# Patient Record
Sex: FEMALE | URBAN - METROPOLITAN AREA
[De-identification: names, ages, dates, MRNs, and addresses within clinical notes are randomized per-mention and may not be internally consistent; named-entity substitution may affect disease eponyms.]

---

## 2021-05-25 ENCOUNTER — RECORDS - HEALTHEAST (OUTPATIENT)
Dept: ADMINISTRATIVE | Facility: CLINIC | Age: 66
End: 2021-05-25

## 2024-07-16 RX ORDER — LISINOPRIL 10 MG/1
10 TABLET ORAL DAILY
COMMUNITY

## 2024-07-16 RX ORDER — ALENDRONATE SODIUM 70 MG/1
70 TABLET ORAL
COMMUNITY

## 2024-07-16 RX ORDER — CALCIUM CARBONATE 500(1250)
1 TABLET ORAL 2 TIMES DAILY WITH MEALS
COMMUNITY

## 2024-07-17 ENCOUNTER — HOSPITAL ENCOUNTER (OUTPATIENT)
Facility: HOSPITAL | Age: 69
Discharge: HOME | End: 2024-07-17
Attending: OBSTETRICS & GYNECOLOGY | Admitting: OBSTETRICS & GYNECOLOGY
Payer: COMMERCIAL

## 2024-07-17 ENCOUNTER — ANESTHESIA EVENT (OUTPATIENT)
Dept: SURGERY | Facility: HOSPITAL | Age: 69
End: 2024-07-17
Payer: COMMERCIAL

## 2024-07-17 ENCOUNTER — ANESTHESIA (OUTPATIENT)
Dept: SURGERY | Facility: HOSPITAL | Age: 69
End: 2024-07-17
Payer: COMMERCIAL

## 2024-07-17 VITALS
TEMPERATURE: 97.9 F | DIASTOLIC BLOOD PRESSURE: 55 MMHG | OXYGEN SATURATION: 99 % | SYSTOLIC BLOOD PRESSURE: 110 MMHG | WEIGHT: 171.5 LBS | RESPIRATION RATE: 16 BRPM | HEART RATE: 64 BPM

## 2024-07-17 DIAGNOSIS — N81.4 UTEROVAGINAL PROLAPSE: Primary | ICD-10-CM

## 2024-07-17 LAB
ABO/RH(D): NORMAL
ANTIBODY SCREEN: NEGATIVE
BASOPHILS # BLD AUTO: 0.1 10E3/UL (ref 0–0.2)
BASOPHILS NFR BLD AUTO: 1 %
EOSINOPHIL # BLD AUTO: 0.2 10E3/UL (ref 0–0.7)
EOSINOPHIL NFR BLD AUTO: 3 %
ERYTHROCYTE [DISTWIDTH] IN BLOOD BY AUTOMATED COUNT: 13.9 % (ref 10–15)
HCT VFR BLD AUTO: 43.3 % (ref 35–47)
HGB BLD-MCNC: 14.5 G/DL (ref 11.7–15.7)
IMM GRANULOCYTES # BLD: 0.1 10E3/UL
IMM GRANULOCYTES NFR BLD: 1 %
LYMPHOCYTES # BLD AUTO: 1.5 10E3/UL (ref 0.8–5.3)
LYMPHOCYTES NFR BLD AUTO: 23 %
MCH RBC QN AUTO: 29.9 PG (ref 26.5–33)
MCHC RBC AUTO-ENTMCNC: 33.5 G/DL (ref 31.5–36.5)
MCV RBC AUTO: 89 FL (ref 78–100)
MONOCYTES # BLD AUTO: 0.4 10E3/UL (ref 0–1.3)
MONOCYTES NFR BLD AUTO: 6 %
NEUTROPHILS # BLD AUTO: 4.4 10E3/UL (ref 1.6–8.3)
NEUTROPHILS NFR BLD AUTO: 67 %
NRBC # BLD AUTO: 0 10E3/UL
NRBC BLD AUTO-RTO: 0 /100
PLATELET # BLD AUTO: 316 10E3/UL (ref 150–450)
RBC # BLD AUTO: 4.85 10E6/UL (ref 3.8–5.2)
SPECIMEN EXPIRATION DATE: NORMAL
WBC # BLD AUTO: 6.7 10E3/UL (ref 4–11)

## 2024-07-17 PROCEDURE — 250N000013 HC RX MED GY IP 250 OP 250 PS 637: Performed by: OBSTETRICS & GYNECOLOGY

## 2024-07-17 PROCEDURE — 250N000011 HC RX IP 250 OP 636: Performed by: PHYSICIAN ASSISTANT

## 2024-07-17 PROCEDURE — 370N000017 HC ANESTHESIA TECHNICAL FEE, PER MIN: Performed by: OBSTETRICS & GYNECOLOGY

## 2024-07-17 PROCEDURE — 258N000003 HC RX IP 258 OP 636: Performed by: ANESTHESIOLOGY

## 2024-07-17 PROCEDURE — 86900 BLOOD TYPING SEROLOGIC ABO: CPT | Performed by: PHYSICIAN ASSISTANT

## 2024-07-17 PROCEDURE — 88311 DECALCIFY TISSUE: CPT | Mod: TC | Performed by: OBSTETRICS & GYNECOLOGY

## 2024-07-17 PROCEDURE — 258N000003 HC RX IP 258 OP 636: Performed by: OBSTETRICS & GYNECOLOGY

## 2024-07-17 PROCEDURE — 250N000011 HC RX IP 250 OP 636: Performed by: NURSE ANESTHETIST, CERTIFIED REGISTERED

## 2024-07-17 PROCEDURE — 85025 COMPLETE CBC W/AUTO DIFF WBC: CPT | Performed by: PHYSICIAN ASSISTANT

## 2024-07-17 PROCEDURE — 44180 LAP ENTEROLYSIS: CPT | Performed by: ANESTHESIOLOGY

## 2024-07-17 PROCEDURE — 710N000009 HC RECOVERY PHASE 1, LEVEL 1, PER MIN: Performed by: OBSTETRICS & GYNECOLOGY

## 2024-07-17 PROCEDURE — 999N000141 HC STATISTIC PRE-PROCEDURE NURSING ASSESSMENT: Performed by: OBSTETRICS & GYNECOLOGY

## 2024-07-17 PROCEDURE — 250N000011 HC RX IP 250 OP 636: Performed by: ANESTHESIOLOGY

## 2024-07-17 PROCEDURE — 250N000009 HC RX 250: Performed by: NURSE ANESTHETIST, CERTIFIED REGISTERED

## 2024-07-17 PROCEDURE — 250N000013 HC RX MED GY IP 250 OP 250 PS 637: Performed by: ANESTHESIOLOGY

## 2024-07-17 PROCEDURE — 36415 COLL VENOUS BLD VENIPUNCTURE: CPT | Performed by: PHYSICIAN ASSISTANT

## 2024-07-17 PROCEDURE — 44180 LAP ENTEROLYSIS: CPT | Performed by: NURSE ANESTHETIST, CERTIFIED REGISTERED

## 2024-07-17 PROCEDURE — 250N000011 HC RX IP 250 OP 636: Performed by: OBSTETRICS & GYNECOLOGY

## 2024-07-17 PROCEDURE — 272N000001 HC OR GENERAL SUPPLY STERILE: Performed by: OBSTETRICS & GYNECOLOGY

## 2024-07-17 PROCEDURE — C1781 MESH (IMPLANTABLE): HCPCS | Performed by: OBSTETRICS & GYNECOLOGY

## 2024-07-17 PROCEDURE — 710N000012 HC RECOVERY PHASE 2, PER MINUTE: Performed by: OBSTETRICS & GYNECOLOGY

## 2024-07-17 PROCEDURE — 360N000080 HC SURGERY LEVEL 7, PER MIN: Performed by: OBSTETRICS & GYNECOLOGY

## 2024-07-17 DEVICE — POLYPROPYLENE MESH FOR SACROCOLPOSUSPENSION/SACROCOLPOPEXY - Y CONTOUR™
Type: IMPLANTABLE DEVICE | Site: PELVIS | Status: FUNCTIONAL
Brand: RESTORELLE

## 2024-07-17 RX ORDER — DEXAMETHASONE SODIUM PHOSPHATE 10 MG/ML
INJECTION, SOLUTION INTRAMUSCULAR; INTRAVENOUS PRN
Status: DISCONTINUED | OUTPATIENT
Start: 2024-07-17 | End: 2024-07-17

## 2024-07-17 RX ORDER — CEFAZOLIN SODIUM/WATER 2 G/20 ML
2 SYRINGE (ML) INTRAVENOUS
Status: COMPLETED | OUTPATIENT
Start: 2024-07-17 | End: 2024-07-17

## 2024-07-17 RX ORDER — FENTANYL CITRATE-0.9 % NACL/PF 10 MCG/ML
PLASTIC BAG, INJECTION (ML) INTRAVENOUS PRN
Status: DISCONTINUED | OUTPATIENT
Start: 2024-07-17 | End: 2024-07-17

## 2024-07-17 RX ORDER — FENTANYL CITRATE 50 UG/ML
INJECTION, SOLUTION INTRAMUSCULAR; INTRAVENOUS PRN
Status: DISCONTINUED | OUTPATIENT
Start: 2024-07-17 | End: 2024-07-17

## 2024-07-17 RX ORDER — IBUPROFEN 200 MG
600 TABLET ORAL ONCE
Status: DISCONTINUED | OUTPATIENT
Start: 2024-07-17 | End: 2024-07-17 | Stop reason: HOSPADM

## 2024-07-17 RX ORDER — ONDANSETRON 4 MG/1
4 TABLET, ORALLY DISINTEGRATING ORAL EVERY 30 MIN PRN
Status: DISCONTINUED | OUTPATIENT
Start: 2024-07-17 | End: 2024-07-17 | Stop reason: HOSPADM

## 2024-07-17 RX ORDER — SODIUM CHLORIDE, SODIUM LACTATE, POTASSIUM CHLORIDE, AND CALCIUM CHLORIDE .6; .31; .03; .02 G/100ML; G/100ML; G/100ML; G/100ML
IRRIGANT IRRIGATION PRN
Status: DISCONTINUED | OUTPATIENT
Start: 2024-07-17 | End: 2024-07-17 | Stop reason: HOSPADM

## 2024-07-17 RX ORDER — NALOXONE HYDROCHLORIDE 0.4 MG/ML
0.1 INJECTION, SOLUTION INTRAMUSCULAR; INTRAVENOUS; SUBCUTANEOUS
Status: DISCONTINUED | OUTPATIENT
Start: 2024-07-17 | End: 2024-07-17 | Stop reason: HOSPADM

## 2024-07-17 RX ORDER — SODIUM CHLORIDE, SODIUM LACTATE, POTASSIUM CHLORIDE, CALCIUM CHLORIDE 600; 310; 30; 20 MG/100ML; MG/100ML; MG/100ML; MG/100ML
INJECTION, SOLUTION INTRAVENOUS CONTINUOUS
Status: DISCONTINUED | OUTPATIENT
Start: 2024-07-17 | End: 2024-07-17 | Stop reason: HOSPADM

## 2024-07-17 RX ORDER — OXYCODONE HYDROCHLORIDE 5 MG/1
5-10 TABLET ORAL EVERY 4 HOURS PRN
Qty: 12 TABLET | Refills: 0 | Status: SHIPPED | OUTPATIENT
Start: 2024-07-17

## 2024-07-17 RX ORDER — KETAMINE HYDROCHLORIDE 10 MG/ML
INJECTION INTRAMUSCULAR; INTRAVENOUS PRN
Status: DISCONTINUED | OUTPATIENT
Start: 2024-07-17 | End: 2024-07-17

## 2024-07-17 RX ORDER — CEFAZOLIN SODIUM/WATER 2 G/20 ML
2 SYRINGE (ML) INTRAVENOUS SEE ADMIN INSTRUCTIONS
Status: DISCONTINUED | OUTPATIENT
Start: 2024-07-17 | End: 2024-07-17 | Stop reason: HOSPADM

## 2024-07-17 RX ORDER — HYDROMORPHONE HCL IN WATER/PF 6 MG/30 ML
0.2 PATIENT CONTROLLED ANALGESIA SYRINGE INTRAVENOUS EVERY 5 MIN PRN
Status: DISCONTINUED | OUTPATIENT
Start: 2024-07-17 | End: 2024-07-17 | Stop reason: HOSPADM

## 2024-07-17 RX ORDER — HYDROMORPHONE HYDROCHLORIDE 1 MG/ML
0.5 INJECTION, SOLUTION INTRAMUSCULAR; INTRAVENOUS; SUBCUTANEOUS EVERY 5 MIN PRN
Status: DISCONTINUED | OUTPATIENT
Start: 2024-07-17 | End: 2024-07-17 | Stop reason: HOSPADM

## 2024-07-17 RX ORDER — ONDANSETRON 2 MG/ML
4 INJECTION INTRAMUSCULAR; INTRAVENOUS EVERY 30 MIN PRN
Status: DISCONTINUED | OUTPATIENT
Start: 2024-07-17 | End: 2024-07-17 | Stop reason: HOSPADM

## 2024-07-17 RX ORDER — OXYCODONE HYDROCHLORIDE 5 MG/1
5 TABLET ORAL
Status: COMPLETED | OUTPATIENT
Start: 2024-07-17 | End: 2024-07-17

## 2024-07-17 RX ORDER — DEXAMETHASONE SODIUM PHOSPHATE 4 MG/ML
4 INJECTION, SOLUTION INTRA-ARTICULAR; INTRALESIONAL; INTRAMUSCULAR; INTRAVENOUS; SOFT TISSUE
Status: DISCONTINUED | OUTPATIENT
Start: 2024-07-17 | End: 2024-07-17 | Stop reason: HOSPADM

## 2024-07-17 RX ORDER — ACETAMINOPHEN 325 MG/1
975 TABLET ORAL ONCE
Status: COMPLETED | OUTPATIENT
Start: 2024-07-17 | End: 2024-07-17

## 2024-07-17 RX ORDER — LIDOCAINE 40 MG/G
CREAM TOPICAL
Status: DISCONTINUED | OUTPATIENT
Start: 2024-07-17 | End: 2024-07-17 | Stop reason: HOSPADM

## 2024-07-17 RX ORDER — LABETALOL HYDROCHLORIDE 5 MG/ML
5 INJECTION, SOLUTION INTRAVENOUS EVERY 10 MIN PRN
Status: DISCONTINUED | OUTPATIENT
Start: 2024-07-17 | End: 2024-07-17 | Stop reason: HOSPADM

## 2024-07-17 RX ORDER — OXYCODONE HYDROCHLORIDE 5 MG/1
10 TABLET ORAL
Status: DISCONTINUED | OUTPATIENT
Start: 2024-07-17 | End: 2024-07-17 | Stop reason: HOSPADM

## 2024-07-17 RX ORDER — PROPOFOL 10 MG/ML
INJECTION, EMULSION INTRAVENOUS PRN
Status: DISCONTINUED | OUTPATIENT
Start: 2024-07-17 | End: 2024-07-17

## 2024-07-17 RX ORDER — DEXAMETHASONE SODIUM PHOSPHATE 10 MG/ML
4 INJECTION, SOLUTION INTRAMUSCULAR; INTRAVENOUS
Status: DISCONTINUED | OUTPATIENT
Start: 2024-07-17 | End: 2024-07-17 | Stop reason: HOSPADM

## 2024-07-17 RX ORDER — ACETAMINOPHEN 325 MG/1
975 TABLET ORAL ONCE
Status: DISCONTINUED | OUTPATIENT
Start: 2024-07-17 | End: 2024-07-17 | Stop reason: HOSPADM

## 2024-07-17 RX ORDER — PROPOFOL 10 MG/ML
INJECTION, EMULSION INTRAVENOUS CONTINUOUS PRN
Status: DISCONTINUED | OUTPATIENT
Start: 2024-07-17 | End: 2024-07-17

## 2024-07-17 RX ORDER — LIDOCAINE HYDROCHLORIDE 10 MG/ML
INJECTION, SOLUTION INFILTRATION; PERINEURAL PRN
Status: DISCONTINUED | OUTPATIENT
Start: 2024-07-17 | End: 2024-07-17

## 2024-07-17 RX ORDER — METRONIDAZOLE 500 MG/100ML
500 INJECTION, SOLUTION INTRAVENOUS ONCE
Status: COMPLETED | OUTPATIENT
Start: 2024-07-17 | End: 2024-07-17

## 2024-07-17 RX ORDER — MAGNESIUM SULFATE 4 G/50ML
4 INJECTION INTRAVENOUS ONCE
Status: COMPLETED | OUTPATIENT
Start: 2024-07-17 | End: 2024-07-17

## 2024-07-17 RX ORDER — ONDANSETRON 2 MG/ML
INJECTION INTRAMUSCULAR; INTRAVENOUS PRN
Status: DISCONTINUED | OUTPATIENT
Start: 2024-07-17 | End: 2024-07-17

## 2024-07-17 RX ORDER — FENTANYL CITRATE 50 UG/ML
25 INJECTION, SOLUTION INTRAMUSCULAR; INTRAVENOUS
Status: DISCONTINUED | OUTPATIENT
Start: 2024-07-17 | End: 2024-07-17 | Stop reason: HOSPADM

## 2024-07-17 RX ORDER — BUPIVACAINE HYDROCHLORIDE 2.5 MG/ML
INJECTION, SOLUTION INFILTRATION; PERINEURAL PRN
Status: DISCONTINUED | OUTPATIENT
Start: 2024-07-17 | End: 2024-07-17 | Stop reason: HOSPADM

## 2024-07-17 RX ADMIN — SODIUM CHLORIDE, POTASSIUM CHLORIDE, SODIUM LACTATE AND CALCIUM CHLORIDE: 600; 310; 30; 20 INJECTION, SOLUTION INTRAVENOUS at 11:15

## 2024-07-17 RX ADMIN — MAGNESIUM SULFATE HEPTAHYDRATE 4 G: 80 INJECTION, SOLUTION INTRAVENOUS at 08:18

## 2024-07-17 RX ADMIN — ROCURONIUM BROMIDE 40 MG: 50 INJECTION, SOLUTION INTRAVENOUS at 10:12

## 2024-07-17 RX ADMIN — SUGAMMADEX 200 MG: 100 INJECTION, SOLUTION INTRAVENOUS at 12:11

## 2024-07-17 RX ADMIN — ONDANSETRON 4 MG: 2 INJECTION INTRAMUSCULAR; INTRAVENOUS at 10:35

## 2024-07-17 RX ADMIN — ACETAMINOPHEN 975 MG: 325 TABLET ORAL at 07:58

## 2024-07-17 RX ADMIN — Medication 100 MCG: at 11:23

## 2024-07-17 RX ADMIN — KETAMINE HYDROCHLORIDE 20 MG: 10 INJECTION INTRAMUSCULAR; INTRAVENOUS at 11:12

## 2024-07-17 RX ADMIN — HYDROMORPHONE HYDROCHLORIDE 0.2 MG: 0.2 INJECTION, SOLUTION INTRAMUSCULAR; INTRAVENOUS; SUBCUTANEOUS at 13:27

## 2024-07-17 RX ADMIN — LIDOCAINE HYDROCHLORIDE 3 ML: 10 INJECTION, SOLUTION INFILTRATION; PERINEURAL at 10:12

## 2024-07-17 RX ADMIN — METRONIDAZOLE 500 MG: 5 INJECTION, SOLUTION INTRAVENOUS at 08:18

## 2024-07-17 RX ADMIN — PROPOFOL 150 MCG/KG/MIN: 10 INJECTION, EMULSION INTRAVENOUS at 10:11

## 2024-07-17 RX ADMIN — HYDROMORPHONE HYDROCHLORIDE 1 MG: 1 INJECTION, SOLUTION INTRAMUSCULAR; INTRAVENOUS; SUBCUTANEOUS at 10:34

## 2024-07-17 RX ADMIN — PROPOFOL 200 MG: 10 INJECTION, EMULSION INTRAVENOUS at 10:11

## 2024-07-17 RX ADMIN — FENTANYL CITRATE 100 MCG: 50 INJECTION INTRAMUSCULAR; INTRAVENOUS at 10:12

## 2024-07-17 RX ADMIN — SODIUM CHLORIDE, POTASSIUM CHLORIDE, SODIUM LACTATE AND CALCIUM CHLORIDE: 600; 310; 30; 20 INJECTION, SOLUTION INTRAVENOUS at 08:18

## 2024-07-17 RX ADMIN — KETAMINE HYDROCHLORIDE 10 MG: 10 INJECTION INTRAMUSCULAR; INTRAVENOUS at 10:40

## 2024-07-17 RX ADMIN — Medication 2 G: at 10:08

## 2024-07-17 RX ADMIN — ROCURONIUM BROMIDE 10 MG: 50 INJECTION, SOLUTION INTRAVENOUS at 10:35

## 2024-07-17 RX ADMIN — OXYCODONE HYDROCHLORIDE 5 MG: 5 TABLET ORAL at 15:35

## 2024-07-17 RX ADMIN — ROCURONIUM BROMIDE 30 MG: 50 INJECTION, SOLUTION INTRAVENOUS at 11:12

## 2024-07-17 RX ADMIN — KETAMINE HYDROCHLORIDE 10 MG: 10 INJECTION INTRAMUSCULAR; INTRAVENOUS at 11:00

## 2024-07-17 RX ADMIN — HYDROMORPHONE HYDROCHLORIDE 0.2 MG: 0.2 INJECTION, SOLUTION INTRAMUSCULAR; INTRAVENOUS; SUBCUTANEOUS at 13:10

## 2024-07-17 RX ADMIN — OXYCODONE HYDROCHLORIDE 5 MG: 5 TABLET ORAL at 14:42

## 2024-07-17 RX ADMIN — DEXAMETHASONE SODIUM PHOSPHATE 10 MG: 10 INJECTION, SOLUTION INTRAMUSCULAR; INTRAVENOUS at 10:35

## 2024-07-17 RX ADMIN — KETAMINE HYDROCHLORIDE 10 MG: 10 INJECTION INTRAMUSCULAR; INTRAVENOUS at 12:13

## 2024-07-17 ASSESSMENT — ACTIVITIES OF DAILY LIVING (ADL)
ADLS_ACUITY_SCORE: 34
ADLS_ACUITY_SCORE: 33
ADLS_ACUITY_SCORE: 33
ADLS_ACUITY_SCORE: 35
ADLS_ACUITY_SCORE: 34
ADLS_ACUITY_SCORE: 33
ADLS_ACUITY_SCORE: 34
ADLS_ACUITY_SCORE: 34
ADLS_ACUITY_SCORE: 33
ADLS_ACUITY_SCORE: 33

## 2024-07-17 NOTE — ANESTHESIA POSTPROCEDURE EVALUATION
Patient: Peyman Dallas    Procedure: Procedure(s):  ROBOT ASSISTED LAPAROSCOPIC SUPRACERVICAL HYSTERECTOMY, BILATERAL SALPINGO-OOPHERECTOMY, SACROCOLPOPEXY, PARAVAGINAL REPAIR, LYSIS OF ADHESIONS       Anesthesia Type:  General    Note:  Disposition: Outpatient   Postop Pain Control: Uneventful            Sign Out: Well controlled pain   PONV: No   Neuro/Psych: Uneventful            Sign Out: Acceptable/Baseline neuro status   Airway/Respiratory: Uneventful            Sign Out: Acceptable/Baseline resp. status   CV/Hemodynamics: Uneventful            Sign Out: Acceptable CV status; No obvious hypovolemia; No obvious fluid overload   Other NRE: NONE   DID A NON-ROUTINE EVENT OCCUR? No       Last vitals:  Vitals Value Taken Time   /65 07/17/24 1400   Temp 36.6  C (97.9  F) 07/17/24 1330   Pulse 60 07/17/24 1409   Resp 17 07/17/24 1409   SpO2 99 % 07/17/24 1409   Vitals shown include unfiled device data.    Electronically Signed By: Raul Sy MD  July 17, 2024  4:06 PM

## 2024-07-17 NOTE — OP NOTE
Peyman Dallas  Birthdate 1955  Medical [place][id number]    [facility] [place]   Date: 7/17/2024    Pre-operative diagnosis: Uterovaginal prolapse with apical and anterior defect      Post-operative diagnosis: Same plus pelvic adhesions      Procedure: Robotic laparoscopic supracervical hysterectomy bilateral salpingectomy sacrocolpopexy bilateral paravaginal defect repair, lysis of adhesions      Attending Surgeon: Jordan De La O MD  Female Pelvic Medicine and Reconstructive Surgery    Assistant:ANDREI Mars        Anesthesia: Normal      Estimated Blood Loss: 20      Drains: None      Specimens: Uterus tubes      Findings: There were significant omental adhesions to the anterior abdominal wall that extended down to the bladder flap.  Both ovaries were small.  There was a large apical and anterior defect.  After repair of these there was no significant posterior defect.  Ovaries appeared normal.  Upper abdomen was normal.      Complications: None      Implants:Restorelle Coloplast Y mesh      Immediate postoperative plan: Discharge to home today with follow-up in the office in 1 to 2 weeks.    Indication for procedure: This is a 69-year-old female with symptomatic uterovaginal prolapse.  Desired surgical correction.  The risk benefits and alternatives were discussed at length.  She expressed an understanding and wished to proceed.      Details procedure and intraoperative findings: Patient was brought to the operating room and after induction of general anesthesia was prepped and draped in the dorsolithotomy position a timeout was called and the patient and procedure were identified.  A Janine catheter was attached to the cervix.  Mathews was also placed.  Attention was then directed to the abdomen where a supraumbilical incision was made.  A varies needle was inserted and the abdomen was insufflated with 3 L of CO2.  An 8 mm trocar and trocar sheath was  inserted and a laparoscope was introduced.  2 additional incisions were made to the right into the left and the appropriate robotic trocars were placed approximately 10 cm apart.  This was done under direct visualization.    The patient was placed in steep Trendelenburg and the robot was docked.  I took my place at the console and identified the anatomy.  Both ureters were identified transperitoneally uterus tubes and ovaries were inspected and were noted to be normal.  The adhesions to the anterior abdominal wall were taken down using a monopolar scissors.  The vessel sealer was then used to come across the infundibulopelvic ligaments.  The same instrument was then used to come across the uterine suspensory round and broad ligaments.  A series of parametrial bites were then obtained.  The bladder flap was taken down using monopolar scissors.  The same instrument was used to take the peritoneum off the uterine torus.  The uterus was then removed from the cervix at the uterocervical junction and the specimen was temporarily placed in the left upper quadrant.    Attention was then directed to the sacral promontory.  Colon was retracted laterally.  The peritoneum over the sacral promontory was opened.  The fat pad retracted medially.  The median sacral artery was cauterized.  In the anterior longitudinal ligament exposed.  The cervix was then oversewn using a V-Loc suture.  The Restoril Y-shaped mesh was then introduced and fixed both anteriorly and posteriorly using using a running V-Loc suture.  A Maryland forceps was then used to tunnel from the sacral promontory to the right uterosacral ligament where the long arm of the mesh was brought up to the anterior longitudinal ligament.  This was then fixed using 3 interrupted Biscoe-Mikey sutures in the long arm of the mesh was appropriately trimmed.  The area over the sacral promontory and the area over the cervical stump were reperitonealized using a running V-Loc  suture.    The bladder was then filled with 200 cc of saline and the space of Retzius was opened using the monopolar scissors.  The adventitia was then cleaned and the bilateral paravaginal defect exposed a permanent V-Loc suture was then used to reapproximate the vaginal fascia to the arcus tendineus with excellent elevation of the anterior compartment.  Was done on both the right and left side.  Hemostasis was noted.  The space of Retzius was then reperitonealized using a running V-Loc suture.    The robot was then undocked and the morcellator was introduced and the specimen was removed using multiple passes.  All small parts were removed.  At this point the decision was made to terminate the procedure.    Robot was undocked. Skin was closed with nylon suture. Sponge and instrument counts were correct x2. The Mathews was draining clear yellow urine and was removed.  My assistant was present throughout the procedure.  Briefing was performed and agreed as to specimens, blood loss, and procedure.  Patient was taken to the recovery room in good condition.    Jordan De La O MD  Female Pelvic Medicine and Reconstructive Surgery          CC:Jordan De La O MD  Female Pelvic Medicine and Reconstructive Surgery

## 2024-07-17 NOTE — H&P
History and Physical Update    I have examined the patient and reviewed the history and physical that is present on this chart. The changes in the patient's history and physical condition are as follows:    None    Jordan De La O MD

## 2024-07-17 NOTE — ANESTHESIA PROCEDURE NOTES
Airway         Procedure Start/Stop Times: 7/17/2024 10:11 AM and 7/17/2024 10:15 AM  Staff -        Anesthesiologist:  Raul Sy MD       CRNA: Alejandro Abad APRN CRNA       Performed By: anesthesiologistIndications and Patient Condition       Indications for airway management: charlette-procedural       Induction type:intravenous       Mask difficulty assessment: 1 - vent by mask    Final Airway Details       Final airway type: endotracheal airway       Successful airway: ETT - single  Endotracheal Airway Details        ETT size (mm): 7.0       Cuffed: yes       Successful intubation technique: direct laryngoscopy       DL Blade Type: Bush 2       Grade View of Cords: 1       Position: Right       Measured from: gums/teeth       Secured at (cm): 21       Bite block used: None    Post intubation assessment        Placement verified by: capnometry, equal breath sounds and chest rise        Number of attempts at approach: 1       Number of other approaches attempted: 0       Secured with: tape       Ease of procedure: easy       Dentition: Intact    Medication(s) Administered   Medication Administration Time: 7/17/2024 10:11 AM

## 2024-07-17 NOTE — ANESTHESIA CARE TRANSFER NOTE
Patient: Peyman Dallas    Procedure: Procedure(s):  ROBOT ASSISTED LAPAROSCOPIC SUPRACERVICAL HYSTERECTOMY, BILATERAL SALPINGO-OOPHERECTOMY, SACROCOLPOPEXY, PARAVAGINAL REPAIR, LYSIS OF ADHESIONS       Diagnosis: Female genital prolapse [N81.9]  Diagnosis Additional Information: No value filed.    Anesthesia Type:   General     Note:    Oropharynx: oropharynx clear of all foreign objects  Level of Consciousness: drowsy and awake  Oxygen Supplementation: nasal cannula  Level of Supplemental Oxygen (L/min / FiO2): 3  Independent Airway: airway patency satisfactory and stable  Dentition: dentition unchanged  Vital Signs Stable: post-procedure vital signs reviewed and stable  Report to RN Given: handoff report given  Patient transferred to: PACU    Handoff Report: Identifed the Patient, Identified the Reponsible Provider, Reviewed the pertinent medical history, Discussed the surgical course, Reviewed Intra-OP anesthesia mangement and issues during anesthesia, Set expectations for post-procedure period and Allowed opportunity for questions and acknowledgement of understanding      Vitals:  Vitals Value Taken Time   /70 07/17/24 1233   Temp 36.2  C (97.1  F) 07/17/24 1233   Pulse 63 07/17/24 1236   Resp 20 07/17/24 1236   SpO2 99 % 07/17/24 1236   Vitals shown include unfiled device data.    Electronically Signed By: CHECO López CRNA  July 17, 2024  12:37 PM

## 2024-07-17 NOTE — ANESTHESIA PREPROCEDURE EVALUATION
Anesthesia Pre-Procedure Evaluation    Patient: Peyman Dallas   MRN: [id number] : 1955        Procedure : Procedure(s):  ROBOT ASSISTED LAPAROSCOPIC SUPRACERVICAL HYSTERECTOMY, BILATERAL SALPINGO-OOPHERECTOMY, SACROCOLPOPEXY, PARAVAGINAL REPAIR, POSTERIOR REPAIR          Past Medical History:   Diagnosis Date    Allergic rhinitis     Arthritis of finger     Bladder dysfunction     Esophageal dysmotility     Esophageal reflux     Hyperlipidemia     Hypertension     Obesity     Osteoporosis     PONV (postoperative nausea and vomiting)     Urinary incontinence       Past Surgical History:   Procedure Laterality Date     SECTION      CLOSED REDUCTION RIGHT ANKLE FRACTURE 12/15/2016      COLONOSCOPY      EGD        No Known Allergies   Social History     Tobacco Use    Smoking status: Never     Passive exposure: Never    Smokeless tobacco: Never   Substance Use Topics    Alcohol use: Yes     Alcohol/week: 2.0 standard drinks of alcohol     Types: 2 Glasses of wine per week     Comment: Wine with dinner      Wt Readings from Last 1 Encounters:   24 77.8 kg (171 lb 8 oz)        Anesthesia Evaluation   Pt has had prior anesthetic.     History of anesthetic complications  - PONV.      ROS/MED HX  ENT/Pulmonary:     (+)     AMIRA risk factors,                                   Neurologic:  - neg neurologic ROS     Cardiovascular:     (+)  hypertension- -   -  - -                                      METS/Exercise Tolerance: >4 METS    Hematologic:  - neg hematologic  ROS     Musculoskeletal:   (+)  arthritis,             GI/Hepatic:     (+) GERD, Asymptomatic on medication, esophageal disease (dismotility. Hx of dilation in . No issues since),                 Renal/Genitourinary:  - neg Renal ROS     Endo:     (+)               Obesity,       Psychiatric/Substance Use:  - neg psychiatric ROS     Infectious Disease:  - neg infectious disease ROS     Malignancy:       Other:            Physical  "Exam    Airway        Mallampati: III   TM distance: > 3 FB   Neck ROM: full   Mouth opening: > 3 cm    Respiratory Devices and Support         Dental       (+) Modest Abnormalities - crowns, retainers, 1 or 2 missing teeth      Cardiovascular          Rhythm and rate: regular     Pulmonary           breath sounds clear to auscultation           OUTSIDE LABS:  CBC:   Lab Results   Component Value Date    WBC 6.7 07/17/2024    HGB 14.5 07/17/2024    HCT 43.3 07/17/2024     07/17/2024     BMP: No results found for: \"NA\", \"POTASSIUM\", \"CHLORIDE\", \"CO2\", \"BUN\", \"CR\", \"GLC\"  COAGS: No results found for: \"PTT\", \"INR\", \"FIBR\"  POC: No results found for: \"BGM\", \"HCG\", \"HCGS\"  HEPATIC: No results found for: \"ALBUMIN\", \"PROTTOTAL\", \"ALT\", \"AST\", \"GGT\", \"ALKPHOS\", \"BILITOTAL\", \"BILIDIRECT\", \"MARIA D\"  OTHER: No results found for: \"PH\", \"LACT\", \"A1C\", \"SHARON\", \"PHOS\", \"MAG\", \"LIPASE\", \"AMYLASE\", \"TSH\", \"T4\", \"T3\", \"CRP\", \"SED\"    Anesthesia Plan    ASA Status:  2    NPO Status:  NPO Appropriate    Anesthesia Type: General.     - Airway: ETT   Induction: Intravenous, Propofol.   Maintenance: TIVA.   Techniques and Equipment:     Airway: glidescope possibly needed.       Consents    Anesthesia Plan(s) and associated risks, benefits, and realistic alternatives discussed. Questions answered and patient/representative(s) expressed understanding.     - Discussed:     - Discussed with:  Patient            Postoperative Care    Pain management: Multi-modal analgesia.   PONV prophylaxis: Ondansetron (or other 5HT-3), Dexamethasone or Solumedrol     Comments:    Other Comments:     TIVA  Dilaudid on induction  Ketamine  Robinul  Precedex boluses prn                   Whit Hyde MD                    "

## 2024-07-18 LAB
PATH REPORT.COMMENTS IMP SPEC: NORMAL
PATH REPORT.COMMENTS IMP SPEC: NORMAL
PATH REPORT.FINAL DX SPEC: NORMAL
PATH REPORT.GROSS SPEC: NORMAL
PATH REPORT.MICROSCOPIC SPEC OTHER STN: NORMAL
PATH REPORT.RELEVANT HX SPEC: NORMAL
PHOTO IMAGE: NORMAL

## 2024-07-18 PROCEDURE — 88305 TISSUE EXAM BY PATHOLOGIST: CPT | Mod: 26 | Performed by: PATHOLOGY

## 2024-07-18 PROCEDURE — 88311 DECALCIFY TISSUE: CPT | Mod: 26 | Performed by: PATHOLOGY

## (undated) DEVICE — SU WND CLOSURE V-LOC 90 SZ 2-0 9" GS-22 VLOCM2145

## (undated) DEVICE — PREP POVIDONE-IODINE 10% SOLUTION 4OZ BOTTLE MDS093944

## (undated) DEVICE — ADAPTER DRAPE ALLY AU-AD

## (undated) DEVICE — GLOVE PI ULTRATCH M LF SZ 6.5 PF CUFF TEXT STRL LF 42665

## (undated) DEVICE — DAVINCI XI SEAL UNIVERSAL 5-12MM 470500

## (undated) DEVICE — SYR 10ML LL W/O NDL 302995

## (undated) DEVICE — DAVINCI XI OBTURATOR BLADELESS 8MM 470359

## (undated) DEVICE — ANTIFOG SOLUTION SEE SHARP 150M TROCAR SWABS 30978

## (undated) DEVICE — GLOVE SURG PI ULTRA TOUCH M SZ 7-1/2 LF

## (undated) DEVICE — DAVINCI XI DRAPE ARM 470015

## (undated) DEVICE — SYR 50ML CATH TIP W/O NDL 309620

## (undated) DEVICE — GOWN XXL 9575

## (undated) DEVICE — NEEDLE HYPO MAGELLAN SAFETY 22GA 1 1/2IN 8881850215

## (undated) DEVICE — SUTURE GORTEX 2N02A

## (undated) DEVICE — NDL INSUFFLATION 13GA 120MM C2201

## (undated) DEVICE — DRAPE SHEET TABLE COVER KC 42301*

## (undated) DEVICE — PAD POS XL 1X20X40IN PINK PIGAZZI

## (undated) DEVICE — MORCELLATOR LAPAROSCOPIC LINA XCISE MOR-1515-6

## (undated) DEVICE — SUCTION MANIFOLD NEPTUNE 2 SYS 1 PORT 702-025-000

## (undated) DEVICE — SYR 50ML SLIP TIP W/O NDL 309654

## (undated) DEVICE — SU WND CLOSURE VLOC 180 ABS 0 9" GS-21 VLOCL0346

## (undated) DEVICE — TUBING FILTER TRI-LUMEN AIRSEAL ASC-EVAC1

## (undated) DEVICE — DAVINCI HOT SHEARS TIP COVER  400180

## (undated) DEVICE — SUCTION STRYKERFLOW II 250-070-500

## (undated) DEVICE — PROTECTOR ARM STANDARD ONE STEP

## (undated) DEVICE — CUSTOM PACK DA VINCI GYN SMA5BDVHEA

## (undated) DEVICE — BLADE KNIFE SURG 11 371111

## (undated) DEVICE — LUBRICANT INST ELECTROLUBE EL101

## (undated) DEVICE — DECANTER VIAL 2006S

## (undated) DEVICE — SU MONOCRYL 3-0 PS-2 18" UND Y497G

## (undated) DEVICE — ENDO SHEARS RENEW LAP ENDOCUT SCISSOR TIP 16.5MM 3142

## (undated) DEVICE — SUTURE VICRYL+ 0 36IN CT-1 UND VCP946H

## (undated) DEVICE — DAVINCI XI HANDPIECE ESU VESSEL SEALER 8MM EXT 480422

## (undated) DEVICE — Device

## (undated) DEVICE — ENDO OBTURATOR ACCESS PORT BLADELESS 8X100MM IAS8-100LP

## (undated) DEVICE — SU DERMABOND ADVANCED .7ML DNX12

## (undated) DEVICE — DEVICE CLOSURE V-LOC 2-0 V-20 TAPER POINT 6IN VLOCN0605

## (undated) DEVICE — BRIEF STRETCH XL MPS40

## (undated) DEVICE — PREP CHLORAPREP 26ML TINTED HI-LITE ORANGE 930815

## (undated) DEVICE — SOL WATER IRRIG 1000ML BOTTLE 2F7114

## (undated) DEVICE — GLOVE SURG PI ULTRA TOUCH M SZ 7 LF 42670

## (undated) DEVICE — PREP POVIDONE-IODINE 7.5% SCRUB 4OZ BOTTLE MDS093945

## (undated) DEVICE — MAT FLOOR SURGICAL 40X38 0702140238

## (undated) RX ORDER — FENTANYL CITRATE 50 UG/ML
INJECTION, SOLUTION INTRAMUSCULAR; INTRAVENOUS
Status: DISPENSED
Start: 2024-07-17

## (undated) RX ORDER — LIDOCAINE HYDROCHLORIDE 10 MG/ML
INJECTION, SOLUTION EPIDURAL; INFILTRATION; INTRACAUDAL; PERINEURAL
Status: DISPENSED
Start: 2024-07-17

## (undated) RX ORDER — PROPOFOL 10 MG/ML
INJECTION, EMULSION INTRAVENOUS
Status: DISPENSED
Start: 2024-07-17

## (undated) RX ORDER — GLYCOPYRROLATE 0.2 MG/ML
INJECTION, SOLUTION INTRAMUSCULAR; INTRAVENOUS
Status: DISPENSED
Start: 2024-07-17

## (undated) RX ORDER — ONDANSETRON 2 MG/ML
INJECTION INTRAMUSCULAR; INTRAVENOUS
Status: DISPENSED
Start: 2024-07-17

## (undated) RX ORDER — DEXAMETHASONE SODIUM PHOSPHATE 10 MG/ML
INJECTION, SOLUTION INTRAMUSCULAR; INTRAVENOUS
Status: DISPENSED
Start: 2024-07-17

## (undated) RX ORDER — BUPIVACAINE HYDROCHLORIDE 2.5 MG/ML
INJECTION, SOLUTION EPIDURAL; INFILTRATION; INTRACAUDAL
Status: DISPENSED
Start: 2024-07-17

## (undated) RX ORDER — GINSENG 100 MG
CAPSULE ORAL
Status: DISPENSED
Start: 2024-07-17

## (undated) RX ORDER — LIDOCAINE HYDROCHLORIDE AND EPINEPHRINE 10; 10 MG/ML; UG/ML
INJECTION, SOLUTION INFILTRATION; PERINEURAL
Status: DISPENSED
Start: 2024-07-17